# Patient Record
Sex: FEMALE | Race: WHITE
[De-identification: names, ages, dates, MRNs, and addresses within clinical notes are randomized per-mention and may not be internally consistent; named-entity substitution may affect disease eponyms.]

---

## 2019-01-02 ENCOUNTER — HOSPITAL ENCOUNTER (OUTPATIENT)
Dept: HOSPITAL 75 - PREOP | Age: 84
Discharge: HOME | End: 2019-01-02
Attending: ORTHOPAEDIC SURGERY
Payer: MEDICARE

## 2019-01-02 VITALS — HEIGHT: 64 IN | BODY MASS INDEX: 25.61 KG/M2 | WEIGHT: 150 LBS

## 2019-01-02 DIAGNOSIS — Z01.818: Primary | ICD-10-CM

## 2019-01-07 ENCOUNTER — HOSPITAL ENCOUNTER (OUTPATIENT)
Dept: HOSPITAL 75 - SDC | Age: 84
Discharge: HOME | End: 2019-01-07
Attending: ORTHOPAEDIC SURGERY
Payer: MEDICARE

## 2019-01-07 VITALS — SYSTOLIC BLOOD PRESSURE: 172 MMHG | DIASTOLIC BLOOD PRESSURE: 82 MMHG

## 2019-01-07 VITALS — SYSTOLIC BLOOD PRESSURE: 163 MMHG | DIASTOLIC BLOOD PRESSURE: 72 MMHG

## 2019-01-07 VITALS — BODY MASS INDEX: 26.63 KG/M2 | HEIGHT: 64 IN | WEIGHT: 156 LBS

## 2019-01-07 VITALS — DIASTOLIC BLOOD PRESSURE: 82 MMHG | SYSTOLIC BLOOD PRESSURE: 173 MMHG

## 2019-01-07 VITALS — DIASTOLIC BLOOD PRESSURE: 89 MMHG | SYSTOLIC BLOOD PRESSURE: 171 MMHG

## 2019-01-07 DIAGNOSIS — E03.9: ICD-10-CM

## 2019-01-07 DIAGNOSIS — M41.9: ICD-10-CM

## 2019-01-07 DIAGNOSIS — M51.36: ICD-10-CM

## 2019-01-07 DIAGNOSIS — M54.16: Primary | ICD-10-CM

## 2019-01-07 DIAGNOSIS — Z79.899: ICD-10-CM

## 2019-01-07 DIAGNOSIS — N18.3: ICD-10-CM

## 2019-01-07 DIAGNOSIS — Z90.13: ICD-10-CM

## 2019-01-07 DIAGNOSIS — I12.9: ICD-10-CM

## 2019-01-07 DIAGNOSIS — M16.10: ICD-10-CM

## 2019-01-07 PROCEDURE — 87081 CULTURE SCREEN ONLY: CPT

## 2019-01-07 RX ADMIN — SODIUM CHLORIDE, SODIUM LACTATE, POTASSIUM CHLORIDE, AND CALCIUM CHLORIDE PRN MLS/HR: 600; 310; 30; 20 INJECTION, SOLUTION INTRAVENOUS at 08:00

## 2019-01-07 RX ADMIN — SODIUM CHLORIDE, SODIUM LACTATE, POTASSIUM CHLORIDE, AND CALCIUM CHLORIDE PRN MLS/HR: 600; 310; 30; 20 INJECTION, SOLUTION INTRAVENOUS at 09:45

## 2019-01-07 NOTE — ANESTHESIA-GENERAL POST-OP
General


Patient Condition


Mental Status/LOC:  Same as Preop


Cardiovascular:  Satisfactory


Nausea/Vomiting:  Absent


Respiratory:  Satisfactory


Pain:  Controlled


Complications:  Absent





Post Op Complications


Complications


None





Follow Up Care/Instructions


Patient Instructions


None needed.





Anesthesia/Patient Condition


Patient Condition


Patient is doing well, no complaints, stable vital signs, no apparent adverse 

anesthesia problems.











VIDYA ONEAL DO Jan 7, 2019 10:22

## 2019-01-07 NOTE — DIAGNOSTIC IMAGING REPORT
INDICATION: 

Back pain.



IMPRESSION: 

16.9 seconds fluoroscopy and two digital images in Surgery were

used by Dr. Wellington during dorsal column stimulator lead placement.



Dictated by: 



  Dictated on workstation # [id number]

## 2019-01-07 NOTE — OPERATIVE REPORT
DATE OF SERVICE:  01/07/2019



PREOPERATIVE DIAGNOSES:

Chronic lumbago and chronic lumbar radiculopathy.



POSTOPERATIVE DIAGNOSES:

Chronic lumbago and chronic lumbar radiculopathy.



PROCEDURES PERFORMED:

1.  T9-10 level laminectomy for placement of paddle electrode for a spinal cord

stimulation.

2.  Left-sided battery placement for IPG for spinal cord stimulation and complex

programming of spinal cord stimulator.



DATE AND TIME OF SURGERY:

Please see anesthesia record.



IMPLANTS USED:

[employer] Penta lead, [employer] Prodigy MRI rechargeable battery.



SURGEON:

Shelley Wellington MD.



ASSISTANT:

ORION Cottrell.



ROLE OF FIRST ASSISTANT:

Aid in retraction of the procedure, aid in implantation, instrumentation and

wound closure.



ANESTHESIA:

General endotracheal.



ESTIMATED BLOOD LOSS:

Minimal.



INTRAVENOUS FLUIDS:

Please see anesthesia record.



ANTIBIOTICS:

Ancef.



COMPLICATIONS:

None.



SPECIMENS:

None.



INDICATIONS OF PROCEDURE:

The patient is an 83-year-old female with chronic back pain and leg pain. 

Positive spinal cord stem trial with greater than 50% reduction in her pain and

she desires permanent placement.



NEUROMONITORING:

Standard intraoperative neurophysiologic monitoring carried out by means of real

time continuous high quality bidirectional mode, audio and visual communication

to both the technician and surgeon by Dr. Garcia was performed.  SSEPS, EMGs,

TcMEPs, TOFs were carried out continuously throughout the procedure stable.



DESCRIPTION OF PROCEDURE:

The patient was taken to the preoperative holding area and brought back to the

operative suite.  After induction of general anesthesia, preoperative

antibiotics were given, carefully turned prone on Ariel table, careful padding

to all extremities, sterilely prepped and draped posterior thoracolumbar spine. 

Attention was directed to the midline where the 9-10 level was confirmed.  A

small laminotomy was created.  The paddle lead was placed over T8 and confirmed

on imaging.  It was anchored at the lamina level.  The fascia was then closed

and it was anchored again at the fascial level, strain relief loops were created

and was tunneled to the left-sided flank where the battery site was opened and

connected to a Prodigy MRI compatible battery.  The system was functioning well.

 It was interrogated and conformation that all limbs and levels and sides were

achieved and complex programing was performed on the table and then the wounds

were irrigated, closed in layers and the patient was transferred to recovery

room in stable condition having tolerated the procedure well with stable spinal

monitoring.





Job ID: [id number]

DocumentID: [id number]

Dictated Date:  01/07/2019 09:48:38

Transcription Date: 01/07/2019 13:40:38

Dictated By: SHELLEY WELLINGTON MD

## 2019-01-07 NOTE — DISCHARGE INST-SURGICAL
Discharge Inst-Surgical


Depart Medication/Instructions


New, Converted or Re-Newed RX:  RX on Chart


Final Diagnosis:  spinal stenosis,  chronic pain





Consults/Follow Up


Goal/Follow Up Appt.:  


follow up with Dr. Wellington in 2 weeks


Patient Instructions:  


No lifting over 10 lbs. 


Keep incisions clean and dry


Do not submerge incision, may shower


Keep incision covered for 5 days





Activity


Activity as Tolerated:  No


Activity Instructions:  Avoid Pulling & Pushing, Avoid Stress to Incision


Incentive Spirometry:  Every 2 Hours While Awake





Diet


Discharge Diet:  No Restrictions


Symptoms to Report to Physicia:  Numbness/Tingling, Pain Increased, Fever Over 

101 Degrees F


If Any Problems/Questions/Issu:  Contact Your Physician, Go to Emergency Room





Skin/Wound Care


Infection Signs and Symptoms:  Increased Redness, Foul Odor of Wound, Increased 

Drainage, Increased Swelling, Temperature Above 101  F


Wound Care Comment:  


If wound issues develop, call Dr. Wellington's office or go to ER


Bathing Instructions:  Shower


Operative Area Clean and Dry:  Keep Incision Clean/Dry











RAMON GOINS Jan 7, 2019 12:27

## 2019-01-07 NOTE — PROGRESS NOTE-POST OPERATIVE
Post-Operative Progess Note


Surgeon (s)/Assistant (s)


Surgeon


SHELLEY NEWMAN MD


Assistant:  DAFNE Cottrell





Pre-Operative Diagnosis


LUMBAR STENOSIS





Post-Operative Diagnosis





Same





Procedure & Operative Findings


Date of Procedure


1/7/19


Procedure Performed/Findings


Placement of Spinal Cord Stimulator via laminectomy and programming


Anesthesia Type


GETA





Estimated Blood Loss


Estimated blood loss (mL):  min





Specimens/Packing


Specimens Removed


none











SHELLEY NEWMAN MD Jan 7, 2019 09:49